# Patient Record
Sex: FEMALE | Race: WHITE | NOT HISPANIC OR LATINO | URBAN - METROPOLITAN AREA
[De-identification: names, ages, dates, MRNs, and addresses within clinical notes are randomized per-mention and may not be internally consistent; named-entity substitution may affect disease eponyms.]

---

## 2021-08-17 ENCOUNTER — EMERGENCY (EMERGENCY)
Facility: HOSPITAL | Age: 41
LOS: 1 days | Discharge: HOME | End: 2021-08-17
Attending: EMERGENCY MEDICINE | Admitting: EMERGENCY MEDICINE
Payer: COMMERCIAL

## 2021-08-17 VITALS
WEIGHT: 110.01 LBS | HEART RATE: 76 BPM | SYSTOLIC BLOOD PRESSURE: 120 MMHG | DIASTOLIC BLOOD PRESSURE: 70 MMHG | RESPIRATION RATE: 18 BRPM | TEMPERATURE: 98 F | OXYGEN SATURATION: 99 % | HEIGHT: 65 IN

## 2021-08-17 DIAGNOSIS — R19.7 DIARRHEA, UNSPECIFIED: ICD-10-CM

## 2021-08-17 DIAGNOSIS — Z20.822 CONTACT WITH AND (SUSPECTED) EXPOSURE TO COVID-19: ICD-10-CM

## 2021-08-17 DIAGNOSIS — R10.31 RIGHT LOWER QUADRANT PAIN: ICD-10-CM

## 2021-08-17 LAB
ALBUMIN SERPL ELPH-MCNC: 4.4 G/DL — SIGNIFICANT CHANGE UP (ref 3.3–5)
ALP SERPL-CCNC: 38 U/L — LOW (ref 40–120)
ALT FLD-CCNC: 15 U/L — SIGNIFICANT CHANGE UP (ref 10–45)
ANION GAP SERPL CALC-SCNC: 10 MMOL/L — SIGNIFICANT CHANGE UP (ref 5–17)
APPEARANCE UR: CLEAR — SIGNIFICANT CHANGE UP
AST SERPL-CCNC: 18 U/L — SIGNIFICANT CHANGE UP (ref 10–40)
BACTERIA # UR AUTO: PRESENT /HPF
BASE EXCESS BLDV CALC-SCNC: -0.8 MMOL/L — SIGNIFICANT CHANGE UP (ref -2–3)
BASOPHILS # BLD AUTO: 0 K/UL — SIGNIFICANT CHANGE UP (ref 0–0.2)
BASOPHILS NFR BLD AUTO: 0 % — SIGNIFICANT CHANGE UP (ref 0–2)
BILIRUB SERPL-MCNC: 0.5 MG/DL — SIGNIFICANT CHANGE UP (ref 0.2–1.2)
BILIRUB UR-MCNC: NEGATIVE — SIGNIFICANT CHANGE UP
BUN SERPL-MCNC: 14 MG/DL — SIGNIFICANT CHANGE UP (ref 7–23)
CA-I SERPL-SCNC: 1.16 MMOL/L — SIGNIFICANT CHANGE UP (ref 1.15–1.33)
CALCIUM SERPL-MCNC: 8.8 MG/DL — SIGNIFICANT CHANGE UP (ref 8.4–10.5)
CHLORIDE SERPL-SCNC: 105 MMOL/L — SIGNIFICANT CHANGE UP (ref 96–108)
CO2 BLDV-SCNC: 26.3 MMOL/L — HIGH (ref 22–26)
CO2 SERPL-SCNC: 23 MMOL/L — SIGNIFICANT CHANGE UP (ref 22–31)
COLOR SPEC: YELLOW — SIGNIFICANT CHANGE UP
COMMENT - URINE: SIGNIFICANT CHANGE UP
CREAT SERPL-MCNC: 0.72 MG/DL — SIGNIFICANT CHANGE UP (ref 0.5–1.3)
DIFF PNL FLD: ABNORMAL
EOSINOPHIL # BLD AUTO: 0 K/UL — SIGNIFICANT CHANGE UP (ref 0–0.5)
EOSINOPHIL NFR BLD AUTO: 0 % — SIGNIFICANT CHANGE UP (ref 0–6)
EPI CELLS # UR: SIGNIFICANT CHANGE UP /HPF (ref 0–5)
GAS PNL BLDV: 135 MMOL/L — LOW (ref 136–145)
GAS PNL BLDV: SIGNIFICANT CHANGE UP
GIANT PLATELETS BLD QL SMEAR: PRESENT — SIGNIFICANT CHANGE UP
GLUCOSE SERPL-MCNC: 109 MG/DL — HIGH (ref 70–99)
GLUCOSE UR QL: NEGATIVE — SIGNIFICANT CHANGE UP
HCG UR QL: NEGATIVE — SIGNIFICANT CHANGE UP
HCO3 BLDV-SCNC: 25 MMOL/L — SIGNIFICANT CHANGE UP (ref 22–29)
HCT VFR BLD CALC: 42.4 % — SIGNIFICANT CHANGE UP (ref 34.5–45)
HGB BLD-MCNC: 14.4 G/DL — SIGNIFICANT CHANGE UP (ref 11.5–15.5)
KETONES UR-MCNC: ABNORMAL MG/DL
LACTATE SERPL-SCNC: 1.6 MMOL/L — SIGNIFICANT CHANGE UP (ref 0.5–2)
LEUKOCYTE ESTERASE UR-ACNC: NEGATIVE — SIGNIFICANT CHANGE UP
LIDOCAIN IGE QN: 22 U/L — SIGNIFICANT CHANGE UP (ref 7–60)
LYMPHOCYTES # BLD AUTO: 0.17 K/UL — LOW (ref 1–3.3)
LYMPHOCYTES # BLD AUTO: 2.7 % — LOW (ref 13–44)
MAGNESIUM SERPL-MCNC: 2 MG/DL — SIGNIFICANT CHANGE UP (ref 1.6–2.6)
MANUAL SMEAR VERIFICATION: SIGNIFICANT CHANGE UP
MCHC RBC-ENTMCNC: 32.7 PG — SIGNIFICANT CHANGE UP (ref 27–34)
MCHC RBC-ENTMCNC: 34 GM/DL — SIGNIFICANT CHANGE UP (ref 32–36)
MCV RBC AUTO: 96.1 FL — SIGNIFICANT CHANGE UP (ref 80–100)
METAMYELOCYTES # FLD: 0.9 % — HIGH (ref 0–0)
MONOCYTES # BLD AUTO: 0.27 K/UL — SIGNIFICANT CHANGE UP (ref 0–0.9)
MONOCYTES NFR BLD AUTO: 4.4 % — SIGNIFICANT CHANGE UP (ref 2–14)
NEUTROPHILS # BLD AUTO: 5.74 K/UL — SIGNIFICANT CHANGE UP (ref 1.8–7.4)
NEUTROPHILS NFR BLD AUTO: 92 % — HIGH (ref 43–77)
NITRITE UR-MCNC: NEGATIVE — SIGNIFICANT CHANGE UP
PCO2 BLDV: 44 MMHG — HIGH (ref 39–42)
PH BLDV: 7.36 — SIGNIFICANT CHANGE UP (ref 7.32–7.43)
PH UR: 6 — SIGNIFICANT CHANGE UP (ref 5–8)
PLAT MORPH BLD: ABNORMAL
PLATELET # BLD AUTO: 170 K/UL — SIGNIFICANT CHANGE UP (ref 150–400)
PO2 BLDV: 23 MMHG — SIGNIFICANT CHANGE UP
POTASSIUM BLDV-SCNC: 3.7 MMOL/L — SIGNIFICANT CHANGE UP (ref 3.5–5.1)
POTASSIUM SERPL-MCNC: 3.9 MMOL/L — SIGNIFICANT CHANGE UP (ref 3.5–5.3)
POTASSIUM SERPL-SCNC: 3.9 MMOL/L — SIGNIFICANT CHANGE UP (ref 3.5–5.3)
PROT SERPL-MCNC: 6.6 G/DL — SIGNIFICANT CHANGE UP (ref 6–8.3)
PROT UR-MCNC: 30 MG/DL
RBC # BLD: 4.41 M/UL — SIGNIFICANT CHANGE UP (ref 3.8–5.2)
RBC # FLD: 11.9 % — SIGNIFICANT CHANGE UP (ref 10.3–14.5)
RBC BLD AUTO: NORMAL — SIGNIFICANT CHANGE UP
RBC CASTS # UR COMP ASSIST: < 5 /HPF — SIGNIFICANT CHANGE UP
SAO2 % BLDV: 35 % — SIGNIFICANT CHANGE UP
SARS-COV-2 RNA SPEC QL NAA+PROBE: SIGNIFICANT CHANGE UP
SODIUM SERPL-SCNC: 138 MMOL/L — SIGNIFICANT CHANGE UP (ref 135–145)
SP GR SPEC: >=1.03 — SIGNIFICANT CHANGE UP (ref 1–1.03)
UROBILINOGEN FLD QL: 0.2 E.U./DL — SIGNIFICANT CHANGE UP
WBC # BLD: 6.24 K/UL — SIGNIFICANT CHANGE UP (ref 3.8–10.5)
WBC # FLD AUTO: 6.24 K/UL — SIGNIFICANT CHANGE UP (ref 3.8–10.5)
WBC UR QL: < 5 /HPF — SIGNIFICANT CHANGE UP

## 2021-08-17 PROCEDURE — 74177 CT ABD & PELVIS W/CONTRAST: CPT | Mod: 26,MG

## 2021-08-17 PROCEDURE — 99285 EMERGENCY DEPT VISIT HI MDM: CPT

## 2021-08-17 PROCEDURE — 87635 SARS-COV-2 COVID-19 AMP PRB: CPT

## 2021-08-17 PROCEDURE — G1004: CPT

## 2021-08-17 PROCEDURE — 96375 TX/PRO/DX INJ NEW DRUG ADDON: CPT

## 2021-08-17 PROCEDURE — 84295 ASSAY OF SERUM SODIUM: CPT

## 2021-08-17 PROCEDURE — 81001 URINALYSIS AUTO W/SCOPE: CPT

## 2021-08-17 PROCEDURE — 84132 ASSAY OF SERUM POTASSIUM: CPT

## 2021-08-17 PROCEDURE — 82330 ASSAY OF CALCIUM: CPT

## 2021-08-17 PROCEDURE — 81025 URINE PREGNANCY TEST: CPT

## 2021-08-17 PROCEDURE — 82803 BLOOD GASES ANY COMBINATION: CPT

## 2021-08-17 PROCEDURE — 83690 ASSAY OF LIPASE: CPT

## 2021-08-17 PROCEDURE — 99284 EMERGENCY DEPT VISIT MOD MDM: CPT | Mod: 25

## 2021-08-17 PROCEDURE — 85025 COMPLETE CBC W/AUTO DIFF WBC: CPT

## 2021-08-17 PROCEDURE — 83735 ASSAY OF MAGNESIUM: CPT

## 2021-08-17 PROCEDURE — 74177 CT ABD & PELVIS W/CONTRAST: CPT | Mod: MG

## 2021-08-17 PROCEDURE — 83605 ASSAY OF LACTIC ACID: CPT

## 2021-08-17 PROCEDURE — 36415 COLL VENOUS BLD VENIPUNCTURE: CPT

## 2021-08-17 PROCEDURE — 96374 THER/PROPH/DIAG INJ IV PUSH: CPT | Mod: XU

## 2021-08-17 PROCEDURE — 80053 COMPREHEN METABOLIC PANEL: CPT

## 2021-08-17 RX ORDER — SODIUM CHLORIDE 9 MG/ML
1000 INJECTION INTRAMUSCULAR; INTRAVENOUS; SUBCUTANEOUS ONCE
Refills: 0 | Status: COMPLETED | OUTPATIENT
Start: 2021-08-17 | End: 2021-08-17

## 2021-08-17 RX ORDER — ONDANSETRON 8 MG/1
4 TABLET, FILM COATED ORAL ONCE
Refills: 0 | Status: COMPLETED | OUTPATIENT
Start: 2021-08-17 | End: 2021-08-17

## 2021-08-17 RX ORDER — IOHEXOL 300 MG/ML
30 INJECTION, SOLUTION INTRAVENOUS ONCE
Refills: 0 | Status: COMPLETED | OUTPATIENT
Start: 2021-08-17 | End: 2021-08-17

## 2021-08-17 RX ORDER — MORPHINE SULFATE 50 MG/1
4 CAPSULE, EXTENDED RELEASE ORAL ONCE
Refills: 0 | Status: DISCONTINUED | OUTPATIENT
Start: 2021-08-17 | End: 2021-08-17

## 2021-08-17 RX ADMIN — IOHEXOL 30 MILLILITER(S): 300 INJECTION, SOLUTION INTRAVENOUS at 21:36

## 2021-08-17 RX ADMIN — SODIUM CHLORIDE 1000 MILLILITER(S): 9 INJECTION INTRAMUSCULAR; INTRAVENOUS; SUBCUTANEOUS at 21:36

## 2021-08-17 RX ADMIN — MORPHINE SULFATE 4 MILLIGRAM(S): 50 CAPSULE, EXTENDED RELEASE ORAL at 21:36

## 2021-08-17 RX ADMIN — ONDANSETRON 4 MILLIGRAM(S): 8 TABLET, FILM COATED ORAL at 21:37

## 2021-08-17 NOTE — ED PROVIDER NOTE - PROGRESS NOTE DETAILS
No leukocytosis or actionable lab findings.  Prelim CT scan no acute pathology.  Abdominal discomfort very mild now.  Drinking ginger ale and eating crackers.  Pt visiting from [facility], staying in hotel nearby to [facility].  Return precautions given, pt expressed clear understanding.

## 2021-08-17 NOTE — ED PROVIDER NOTE - PHYSICAL EXAMINATION
CONSTITUTIONAL: NAD   SKIN: Normal color and turgor.    HEAD: NC/AT.  EYES: Conjunctiva clear. EOMI. PERRL.    ENT: Airway clear. Normal voice.   RESPIRATORY:  Normal work of breathing. Lungs CTAB.  CARDIOVASCULAR:  RRR, S1S2. No M/R/G.      GI:  Abdomen soft, mild RLQ tenderness without G/R. Neg Rovsing.    : Equivocal CVAT.   MSK: Neck supple.  No lower extremity edema or calf tenderness.  No joint swelling or ROM limitation.  NEURO: Alert and oriented; CN II-XII grossly intact. Speech clear. 5/5 strength in all extremities.  Good balance. Steady gait.

## 2021-08-17 NOTE — ED PROVIDER NOTE - NSFOLLOWUPINSTRUCTIONS_ED_ALL_ED_FT
[place] diet next 48 hours, avoid dairy.  Stay well-hydrated.  Follow up with your physician upon return to [place].  In the meantime, return to the Emergency Department if you have any new or worsening symptoms, or if you have any concerns.  ========================    Acute Abdominal Pain    WHAT YOU NEED TO KNOW:    Acute abdominal pain usually starts suddenly and gets worse quickly.    Abdominal Organs         DISCHARGE INSTRUCTIONS:    Return to the emergency department if:   •You vomit blood or cannot stop vomiting.      •You have blood in your bowel movement, or it looks like tar.      •You have bleeding from your rectum.      •Your abdomen is larger than usual, more painful, and hard.      •You have severe pain in your abdomen.      •You stop passing gas and having bowel movements.      •You feel weak, dizzy, or faint.      Call your doctor if:   •You have a fever.      •You have new or worsening signs or symptoms.      •Your symptoms do not get better with treatment.      •You have questions or concerns about your condition or care.      Medicines:   •Medicines may be given to decrease pain, treat an infection, and manage your symptoms.      •Take your medicine as directed. Contact your healthcare provider if you think your medicine is not helping or if you have side effects. Tell him or her if you are allergic to any medicine. Keep a list of the medicines, vitamins, and herbs you take. Include the amounts, and when and why you take them. Bring the list or the pill bottles to follow-up visits. Carry your medicine list with you in case of an emergency.      Manage your symptoms:   •Apply heat on your abdomen for 20 to 30 minutes every 2 hours for as many days as directed. Heat helps decrease pain and muscle spasms.      •Make changes to the food you eat, if needed. Do not eat foods that cause abdominal pain or other symptoms. Eat small meals more often. The following changes may also help:?Eat more high-fiber foods if you are constipated. High-fiber foods include fruits, vegetables, whole-grain foods, and legumes.             ?Do not eat foods that cause gas if you have bloating. Examples include broccoli, cabbage, and cauliflower. Do not drink soda or carbonated drinks. These may also cause gas.      ?Do not eat foods or drinks that contain sorbitol or fructose if you have diarrhea and bloating. Some examples are fruit juices, candy, jelly, and sugar-free gum.      ?Do not eat high-fat foods. Examples include fried foods, cheeseburgers, hot dogs, and desserts.      ?Limit or do not have caffeine. Caffeine may make symptoms, such as heart burn or nausea, worse.      ?Drink more liquids to prevent dehydration from diarrhea or vomiting. Ask your healthcare provider how much liquid to drink each day and which liquids are best for you.      •Manage your stress. Stress may cause abdominal pain. Your healthcare provider may recommend relaxation techniques and deep breathing exercises to help decrease your stress. Your provider may recommend you talk to someone about your stress or anxiety, such as a counselor or a trusted friend. Get plenty of sleep and exercise regularly.  [employer] Walking for Exercise           •Limit or do not drink alcohol. Alcohol can make your abdominal pain worse. Ask your healthcare provider if it is okay for you to drink alcohol. Also ask how much is safe for you to drink. A drink of alcohol is 12 ounces of beer, ½ ounce of liquor, or 5 ounces of wine.      •Do not smoke. Nicotine and other chemicals in cigarettes can damage your esophagus and stomach. Ask your healthcare provider for information if you currently smoke and need help to quit. E-cigarettes or smokeless tobacco still contain nicotine. Talk to your healthcare provider before you use these products.      Follow up with your doctor as directed: Write down your questions so you remember to ask them during your visits. 10-Juan-2019

## 2021-08-17 NOTE — ED PROVIDER NOTE - PATIENT PORTAL LINK FT
You can access the FollowMyHealth Patient Portal offered by  by registering at the following website: [email or web address]. By joining [employer]’s FollowMyHealth portal, you will also be able to view your health information using other applications (apps) compatible with our system.

## 2021-08-17 NOTE — ED PROVIDER NOTE - CLINICAL SUMMARY MEDICAL DECISION MAKING FREE TEXT BOX
Acute GI illness with abd pain, vomiting and diarrhea.  HDS, nontoxic appearance. Tender RLQ on exam, will check urine preg to r/o ectopic pregnancy.  Labs, IV fluids, pain meds and antiemetic. Will get CT to r/o appendicitis. Acute GI illness with abd pain, vomiting and diarrhea.  HDS, nontoxic appearance. Tender RLQ on exam, will check urine preg to r/o ectopic pregnancy.  Low suspicion for GYN etiology with large amt diarrhea. Will get CT to r/o appendicitis, though less likely with large diarrhea.  No c. diff risk factors.  Consider viral/foodborne GI illness. Swab for covid.  Labs, IV fluids, pain meds and antiemetic. Acute GI illness with abd pain, vomiting and diarrhea.  HDS, nontoxic appearance. Tender RLQ on exam, will check urine preg to r/o ectopic pregnancy.  Low suspicion for GYN etiology with large amt diarrhea. Will get CT to r/o appendicitis, though less likely with large diarrhea.  Intermittently on doxycycline for acne, will test for C. diff if able to provide stool sample.  Consider viral/foodborne GI illness. Swab for covid.  Labs, IV fluids, pain meds and antiemetic.

## 2021-08-17 NOTE — ED PROVIDER NOTE - NS ED ROS FT
CONSTITUTIONAL: feels mildly dehydrated  NEURO: No headache, no dizziness, no syncope; No focal weakness/tingling/numbness  EYES: No visual changes  ENT: HPI  PULM: No cough or dyspnea  CV: No chest pain or palpitations  GI: HPI  : No dysuria, hematuria, frequency  MSK: No neck pain or back pain, no joint pain  SKIN: no rash or unusual bruising

## 2021-08-17 NOTE — ED PROVIDER NOTE - OBJECTIVE STATEMENT
40 f went to bed last night with mild upset stomach, took Tums at bedtime.  Woke this morning with mid-abdominal cramps, and then developed vomiting and diarrhea throughout the day.  Apx 6 episodes of vomiting, initially undigested food, then brown NBNB emesis.  Also apx 6 episodes diarrhea, inially soft, brown progressing to watery stool. Her significant other shared her food yesterday and has no symptoms.  Currently menstruating, there was some blood in toilet but she is menstruating (period came late) and says it may have been menstrual blood.  Some sweats, but no chills or documented fever.  Mild nasal congestion, and foods tasting bad today.  No throat pain. No chest pain, cough, or dyspnea.  No hx of abdominal surgery.     pmhx covid infection early 2021  pshx none signficant  meds: doxycyline, spironolactone (for acne)  NKDA  social: nonsmoker  immune: fully vaccinated to covid 40 f went to bed last night with mild upset stomach, took Tums at bedtime.  Woke this morning with mid-abdominal cramps, and then developed vomiting and diarrhea throughout the day.  Apx 6 episodes of vomiting, initially undigested food, then brown NBNB emesis.  Also apx 6 episodes diarrhea, inially soft, brown progressing to watery stool. Her significant other shared her food yesterday and has no symptoms.  Currently menstruating, there was some blood in toilet but she is menstruating (period came late) and says it may have been menstrual blood.  Some sweats, but no chills or documented fever.  Mild nasal congestion, and foods tasting bad today.  No throat pain. No chest pain, cough, or dyspnea.  No hx of abdominal surgery. No recent abx or hospitalization, no known recent sick contacts or concerning travel.     pmhx covid infection early 2021  pshx none signficant  meds: doxycyline, spironolactone (for acne)  NKDA  social: nonsmoker  immune: fully vaccinated to covid 40 f went to bed last night with mild upset stomach, took Tums at bedtime.  Woke this morning with mid-abdominal cramps, and then developed vomiting and diarrhea throughout the day.  Apx 6 episodes of vomiting, initially undigested food, then brown NBNB emesis.  Also apx 6 episodes diarrhea, inially soft, brown progressing to watery stool. Using PeptoBismol, Tums, ibuprofen; also took doses of Imodium and diarrhea improving this evening. Her significant other shared her food yesterday and has no symptoms.  Currently menstruating, there was some blood in toilet but she is menstruating (period came late) and says it may have been menstrual blood.  Some sweats, but no chills or documented fever.  Mild nasal congestion, and foods tasting bad today.  No throat pain. No chest pain, cough, or dyspnea.  No hx of abdominal surgery. No recent hospitalization, no known recent sick contacts or concerning travel.     pmhx covid infection early 2021  pshx none signficant  meds: doxycyline, spironolactone (for acne)  NKDA  social: nonsmoker  immune: fully vaccinated to covid

## 2021-08-17 NOTE — ED PROVIDER NOTE - ATTENDING CONTRIBUTION TO CARE
40 F w abd pain- lower right sided  nausea and vomiting w epigastric pain  pepto tums immodium taken w partial relief  vss  s1s2 lungs cta bl  abd soft + RLQ no rbd, guarding  IMP- Lower R abd pain  labs, CT, serial exams

## 2021-08-18 VITALS
TEMPERATURE: 98 F | SYSTOLIC BLOOD PRESSURE: 98 MMHG | OXYGEN SATURATION: 99 % | RESPIRATION RATE: 16 BRPM | HEART RATE: 87 BPM | DIASTOLIC BLOOD PRESSURE: 55 MMHG

## 2021-08-18 NOTE — ED ADULT NURSE REASSESSMENT NOTE - NS ED NURSE REASSESS COMMENT FT1
Received patient in stretcher. AOX4. Patient denies chest pain, pain, discomfort, shortness of breath, difficulty breathing and any form of distress not noted. Patient oriented to ED area. Plan of care discussed and verbalized understanding. All needs attended. Purposeful proactive hourly rounding in progress. PO challenge tolerated by patient. Crackers and water given without complaints of nausea and vomiting. Will continue to monitor. Pending dispo.

## 2024-01-22 NOTE — ED ADULT NURSE NOTE - CINV DISCH MEDS REVIEWED YN
----- Message from Elda Borja sent at 1/22/2024  3:16 PM EST -----  Contact: [phone]  Dr. Page is a Gynecologic oncologist. Pt states she needs to see her once a year. Phone # [phone]    ----- Message -----  From: Liseth De Souza  Sent: 1/22/2024   2:45 PM EST  To: Elda Borja    Can you please get more information.  I need to know what kind of doctor this is and their phone number.    ----- Message -----  From: Elda Borja  Sent: 1/22/2024  11:23 AM EST  To: Liseth De Souza    Pt needs referral EVARISTO Page for her annual check up sent to her insurance Fax # [phone] Please advise          Yes

## 2024-09-30 NOTE — ED ADULT TRIAGE NOTE - MODE OF ARRIVAL
ALT 21 08/09/2024 09:08 AM    AST 21 08/09/2024 09:08 AM     Lab Results   Component Value Date    TSH 1.32 02/22/2014    T4FREE 1.2 11/27/2017     Lab Results   Component Value Date    CHOL 156 (L) 08/09/2024    CHOL 163 05/08/2024    CHOL 159 (L) 02/07/2024     Lab Results   Component Value Date    TRIG 170 (H) 08/09/2024    TRIG 206 (H) 05/08/2024    TRIG 184 (H) 02/07/2024     Lab Results   Component Value Date    HDL 53 (L) 08/09/2024    HDL 51 (L) 05/08/2024    HDL 48 (L) 02/07/2024     This SmartLink has not been configured with any valid records.     Lab Results   Component Value Date    LDL 69 08/09/2024      Assessment:    ICD-10-CM    1. Right upper quadrant abdominal pain  R10.11 hyoscyamine (ANASPAZ;LEVSIN) 0.125 MG tablet      2. Irritable bowel syndrome with diarrhea  K58.0 hyoscyamine (ANASPAZ;LEVSIN) 0.125 MG tablet      3. Complex renal cyst  N28.1 Anand Raines MD, Urology, [place]    2 large right exophytic renal cysts, larger in size when compared to 2022. Referral to Urology.      4. Right flank pain  R10.9 Anand Raines MD, Urology, [place]      5. Non-recurrent bilateral femoral hernia without obstruction or gangrene  K41.20 Yenifer Dunbar DO, General Surgery, [place]     CANCELED: [facility], [place]    Fat containing hernia but patient has had right proximal medial thigh pain with some swelling for 2-3 mths. Referral to general surgery for evaluation.      6. Nonalcoholic fatty liver disease without nonalcoholic steatohepatitis (LOPEZ)  K76.0       7. Stage 3a chronic kidney disease (HCC)  N18.31       8. Severe obesity (BMI 35.0-39.9) with comorbidity  E66.01           Plan:  Viviana was seen today for follow-up.    Diagnoses and all orders for this visit:    Right upper quadrant abdominal pain  -     hyoscyamine (ANASPAZ;LEVSIN) 0.125 MG tablet; Take 1 tablet by mouth every 6 hours as needed for Cramping    Irritable bowel syndrome with diarrhea  -   Walk in